# Patient Record
Sex: MALE | Race: WHITE | NOT HISPANIC OR LATINO | URBAN - METROPOLITAN AREA
[De-identification: names, ages, dates, MRNs, and addresses within clinical notes are randomized per-mention and may not be internally consistent; named-entity substitution may affect disease eponyms.]

---

## 2023-02-13 LAB — HCV AB SER-ACNC: NORMAL

## 2023-03-09 ENCOUNTER — OFFICE VISIT (OUTPATIENT)
Dept: GASTROENTEROLOGY | Facility: CLINIC | Age: 48
End: 2023-03-09

## 2023-03-09 VITALS
SYSTOLIC BLOOD PRESSURE: 118 MMHG | BODY MASS INDEX: 24.61 KG/M2 | HEART RATE: 88 BPM | DIASTOLIC BLOOD PRESSURE: 72 MMHG | WEIGHT: 162.4 LBS | HEIGHT: 68 IN

## 2023-03-09 DIAGNOSIS — T45.1X5A ADVERSE EFFECT OF METHOTREXATE, INITIAL ENCOUNTER: ICD-10-CM

## 2023-03-09 DIAGNOSIS — R79.89 ABNORMAL LIVER FUNCTION TESTS: Primary | ICD-10-CM

## 2023-03-09 DIAGNOSIS — K76.0 HEPATIC STEATOSIS: ICD-10-CM

## 2023-03-09 RX ORDER — FOLIC ACID 1 MG/1
1000 TABLET ORAL DAILY
COMMUNITY
Start: 2023-02-20

## 2023-03-09 RX ORDER — ROSUVASTATIN CALCIUM 5 MG/1
5 TABLET, COATED ORAL DAILY
COMMUNITY
Start: 2023-02-17

## 2023-03-09 NOTE — PROGRESS NOTES
[facility] - Outpatient Consultation  Dalia Kent 52 y o  male MRN: [id number]  Encounter: [id number]    PCP:  Shekhar Cazares MD, [phone]  Referring Provider:  No ref  provider found,     Patient: Dalia Kent, 1975  Reason for Referral: hepatic steatosis     ASSESSMENT/PLAN:  52 y o  male with history of seronegative RA and vitamin D deficiency who presents for initial evaluation for hepatic steatosis  He has no acute liver specific complaints or clinical evidence of hepatic decompensation  He was incidentally found to have mildly elevated serum ALT ~60 and was noted to have mild hepatic steatosis on imaging  He has been on MTX for the last 7 years for treatment seronegative RA, although his cumulative dose is unclear at this time given variability in dosing, but likely exceeds 4-5 grams  He also reports daily EtOH use with up to 7 drinks weekly  I suspect that he has fatty liver disease due to combination EtOH and MTX use but would recommend completing a serologic work-up to exclude autoimmune liver disease given his predisposition  Would recommend an US elastography to assess for hepatic fibrosis and would recommend permanent discontinuation of MTX if he has >F2 disease  MTX often causes elevations in serum transaminases but long term therapy has been associated with fatty liver disease, hepatic fibrosis and rare instances, cirrhosis with portal hypertension  Patients who develop fibrosis often have other risk factors for fatty liver disease, such as EtOH use, obesity, DM and concurrent administration of other hepatoxic agents  Cumulative doses between 1-10 grams have been shown to be associated with mild-moderate histologic abnormalities and fibrosis  However, clinically apparent liver disease and fibrosis is rare with weekly use  I advised that he should continue to avoid EtOH, interval weight gain, and hepatotoxic medications   He will return to clinic in 3 months or sooner if needed  Thank you for the opportunity to consult in his care  - US elastography  - CBC, CMP and INR   - CHRIS, ASMA, AMA and quantitative immunoglobulins  - HAV IgG, HBsAg, HBcAb, HBsAb, HCV Ab; vaccinate if non-immune   - A1AT levels and phenotype  - Iron studies    Eliazar Downey MD  Division of Gastroenterology and Hepatology  [address] [facility]    ============================================================================  CC/HPI: 52 y o  male with history of seronegative RA and vitamin D deficiency who presents for initial evaluation for hepatic steatosis  He has been on MTX for at least 7 years for treatment of seronegative RA  He is unsure of his cumulative dose but states that he had been as high as 20 mg weekly for 1-2 years  He has fluctuated in dosing and was previously on 7 5 mg weekly  He was noted to have abnormal liver tests earlier this year with serum ALT ~60, although records of his blood work are not available to review  He had an US which showed mild hepatic steatosis  He reported daily EtOH use with 1 beer daily but denies excessive EtOH consumption  He stopped drinking after knowledge of his acute hepatitis and his rheumatologist is considering switching MTX for sulfasalazine  He reports improvement of his liver tests after making these changes  He has no personal history of liver disease but reports a family history of EtOH cirrhosis  He denies recreational drug use  ROS: Complete review of systems otherwise negative       PAST MEDICAL/SURGICAL HISTORY:  Past Medical History:   Diagnosis Date   • Hyperlipidemia         Past Surgical History:   Procedure Laterality Date   • COLONOSCOPY         FAMILY/SOCIAL HISTORY:  Family History   Problem Relation Age of Onset   • Cancer Mother    • Breast cancer Mother        Social History     Tobacco Use   • Smoking status: Never   • Smokeless tobacco: Never   • Tobacco comments:     Cigars   Vaping Use   • Vaping Use: Never used   Substance Use Topics   • Alcohol use: Not Currently   • Drug use: Not Currently       MEDICATIONS:  Current Outpatient Medications on File Prior to Visit   Medication Sig Dispense Refill   • folic acid (FOLVITE) 1 mg tablet Take 1,000 mcg by mouth daily     • rosuvastatin (CRESTOR) 5 mg tablet Take 5 mg by mouth daily       No current facility-administered medications on file prior to visit  No Known Allergies    PHYSICAL EXAM:  /72 (BP Location: Left arm, Patient Position: Sitting, Cuff Size: Standard)   Pulse 88   Ht 5' 8" (1 727 m)   Wt 73 7 kg (162 lb 6 4 oz)   BMI 24 69 kg/m²   GENERAL: NAD, AAO  HEENT: anicteric, OP clear, MMM  ABDOMEN: S/ND/NT, normoactive BS, no hepatomegaly, spleen not palpable  EXTREMITIES: no edema  SKIN: no rashes, no palmar erythema, no spider angiomata   NEURO: normal gait, no tremor, no asterixis     LABS/RADIOLOGY/ENDOSCOPY:  No results found for: WBC, HGB, HCT, PLT, LABGLUC, GLUF, BUN, CREATININE, NA, K, CL, CO2, PROT, BILIDIR, ALKPHOS, ALT, AST, GLOB, CALCIUM, EGFR, CHOL, TRIG, HDL, LDL, CALC, PT, INR, PTT, GGT    US abdomen 2/2023    Computed MELD-Na score unavailable  Necessary lab results were not found in the last year  Computed MELD score unavailable  Necessary lab results were not found in the last year

## 2023-03-10 ENCOUNTER — TELEPHONE (OUTPATIENT)
Dept: GASTROENTEROLOGY | Facility: CLINIC | Age: 48
End: 2023-03-10

## 2023-03-10 NOTE — TELEPHONE ENCOUNTER
----- Message from Jenifer Carey MD sent at 3/9/2023  4:41 PM EST -----  Hi can you also fax my note to his rheumatology office?

## 2023-03-10 NOTE — TELEPHONE ENCOUNTER
Called Rheumatologist for recent labs and I have faxed over the office notes   Fax # for Rheumatologist - [phone]

## 2023-04-04 ENCOUNTER — TELEPHONE (OUTPATIENT)
Dept: GASTROENTEROLOGY | Facility: CLINIC | Age: 48
End: 2023-04-04

## 2023-04-05 ENCOUNTER — TELEPHONE (OUTPATIENT)
Dept: GASTROENTEROLOGY | Facility: CLINIC | Age: 48
End: 2023-04-05

## 2023-04-05 NOTE — TELEPHONE ENCOUNTER
Discussed elastography results which showed no advanced fibrosis  He will be getting his blood work today

## 2023-06-08 ENCOUNTER — TELEMEDICINE (OUTPATIENT)
Dept: GASTROENTEROLOGY | Facility: CLINIC | Age: 48
End: 2023-06-08
Payer: COMMERCIAL

## 2023-06-08 VITALS — HEIGHT: 68 IN | BODY MASS INDEX: 23.95 KG/M2 | WEIGHT: 158 LBS

## 2023-06-08 DIAGNOSIS — T45.1X5A ADVERSE EFFECT OF METHOTREXATE, INITIAL ENCOUNTER: ICD-10-CM

## 2023-06-08 DIAGNOSIS — R79.89 ABNORMAL LIVER FUNCTION TESTS: Primary | ICD-10-CM

## 2023-06-08 DIAGNOSIS — K70.9 ALCOHOLIC LIVER DISEASE (HCC): ICD-10-CM

## 2023-06-08 PROCEDURE — 99213 OFFICE O/P EST LOW 20 MIN: CPT | Performed by: STUDENT IN AN ORGANIZED HEALTH CARE EDUCATION/TRAINING PROGRAM

## 2023-06-08 NOTE — PROGRESS NOTES
[facility] - Outpatient Consultation  Bambi Henderson 52 y o  male MRN: [id number]  Encounter: [id number]     PCP:  Alin Garcia MD, [phone]  Referring Provider:  No ref  provider found,      Patient: Bambi Henderson, 1975  Reason for Referral: hepatic steatosis      Virtual Regular Visit    Verification of patient location:    Patient is located at Other in the following state in which I hold an active license [place]      Assessment/Plan:   52 y o  male with history of seronegative RA and vitamin D deficiency who presents for follow up for hepatic steatosis  He has no acute liver specific complaints or clinical evidence of hepatic decompensation      He was incidentally found to have mildly elevated serum ALT ~60 and was noted to have mild hepatic steatosis on imaging  He has been on MTX for the last 7 years for treatment seronegative RA, although his cumulative dose is unclear at this time given variability in dosing, but likely exceeds 4-5 grams  He also reports daily EtOH use with up to 7 drinks weekly but cut down on his intake significantly after knowledge of his liver disease  He had a serologic work-up which was negative for autoimmune disease  Liver chemistries have since normalized with EtOH abstinence and his elastography showed normal LSM        MTX often causes elevations in serum transaminases but long term therapy has been associated with fatty liver disease, hepatic fibrosis and rare instances, cirrhosis with portal hypertension  Patients who develop fibrosis often have other risk factors for fatty liver disease, such as EtOH use, obesity, DM and concurrent administration of other hepatoxic agents  Cumulative doses between 1-10 grams have been shown to be associated with mild-moderate histologic abnormalities and fibrosis  However, clinically apparent liver disease and fibrosis is rare with weekly use       We discussed that he may resume MTX but would recommend continued monitoring of his liver chemistries every 3 months as well as annual elastography to evaluate for hepatic fibrosis  I advised that he should continued to avoid ETOH and maintain a lean body weight to minimize his risk for disease progression  He has an appointment with his rheumatologist today to discuss whether he should be restarted on MTX or consider alternative agents  Timing of his follow up will depend on whether he remains on MTX  If he is to resume MTX, he should to follow up in 6 months or sooner if needed  Thank you for the opportunity to consult in his care  - Needs HAV and HBV vaccination with PCP or local pharmacy     Problem List Items Addressed This Visit    None           Reason for visit is   Chief Complaint   Patient presents with   • Follow-up   • Virtual Regular Visit        Encounter provider Constantin Castillo MD    Provider located at [address]  [phone]      Recent Visits  No visits were found meeting these conditions  Showing recent visits within past 7 days and meeting all other requirements  Today's Visits  Date Type Provider Dept   06/08/23 Telemedicine Constantin Castillo MD [facility] today's visits and meeting all other requirements  Future Appointments  No visits were found meeting these conditions  Showing future appointments within next 150 days and meeting all other requirements       The patient was identified by name and date of birth  Lennox Ferretti was informed that this is a telemedicine visit and that the visit is being conducted through the [address] [employer] platform  He agrees to proceed     My office door was closed  No one else was in the room  He acknowledged consent and understanding of privacy and security of the video platform   The patient has agreed to participate and understands they can discontinue the "visit at any time  Patient is aware this is a billable service  Kushal Hodges is a 52 y o  male with history of seronegative RA and vitamin D deficiency who presents for follow up evaluation for hepatic steatosis  Interval events  - Has stopped EtOH and MTX with improvement of ALT to 20-30   - Serologic work-up negative and US elastography showed normal LSM     Extended liver history   He has been on MTX for at least 7 years for treatment of seronegative RA  He is unsure of his cumulative dose but states that he had been as high as 20 mg weekly for 1-2 years  He has fluctuated in dosing and was previously on 7 5 mg weekly      He was noted to have abnormal liver tests earlier this year with serum ALT ~60, although records of his blood work are not available to review  He had an US which showed mild hepatic steatosis       He reported daily EtOH use with 1 beer daily but denies excessive EtOH consumption  He stopped drinking after knowledge of his acute hepatitis and his rheumatologist is considering switching MTX for sulfasalazine  He reports improvement of his liver tests after making these changes       He has no personal history of liver disease but reports a family history of EtOH cirrhosis  He denies recreational drug use  Past Medical History:   Diagnosis Date   • Hyperlipidemia        Past Surgical History:   Procedure Laterality Date   • COLONOSCOPY         Current Outpatient Medications   Medication Sig Dispense Refill   • folic acid (FOLVITE) 1 mg tablet Take 1,000 mcg by mouth daily     • rosuvastatin (CRESTOR) 5 mg tablet Take 5 mg by mouth daily       No current facility-administered medications for this visit  No Known Allergies    ROS: Complete review of systems otherwise negative       Video Exam    Vitals:    06/08/23 0823   Weight: 71 7 kg (158 lb)   Height: 5' 8\" (1 727 m)     GENERAL: NAD, AAO  HEENT: anicteric, OP clear, MMM  SKIN: no rashes, no palmar " erythema, no spider angiomata   NEURO: normal gait, no tremor, no asterixis     CHRIS-  ASMA-  AMA-  A1AT wnl  A1AT MM  Viral hepatitis panel negative     US elastography: normal LSM    Visit Time  Total Visit Duration: 9 min

## 2023-10-10 ENCOUNTER — TELEPHONE (OUTPATIENT)
Age: 48
End: 2023-10-10

## 2023-10-10 NOTE — TELEPHONE ENCOUNTER
As requested, faxed most recent office notes to [place] at [facility]. Fax confirmation scanned into pt's chart.

## 2023-10-10 NOTE — TELEPHONE ENCOUNTER
Patients GI provider:  Anabelle Oropeza    Number to return call: [phone]    Reason for call: [place] from [facility] calling to obtain the most recent office visit notes for the patient. Requesting these be faxed to [phone].      Scheduled procedure/appointment date if applicable: N/A

## 2024-10-28 ENCOUNTER — TELEPHONE (OUTPATIENT)
Age: 49
End: 2024-10-28

## 2024-10-28 NOTE — TELEPHONE ENCOUNTER
"Left voicemail message today @ [phone] informing Pt that Pt has been scheduled for a \"virtual visit\"  at 9:00 am on 2/20/25 with Dr. Tammy Juarez.  Pt further informed, via vm message, that Pt will receive phone call from office staff approximately 15 minutes before visit is scheduled to start in order to prepare for \"virtual\" visit.  Pt given office phone number, via vm message, should Pt have any questions/concerns and/or need to reschedule appointment.   "

## 2025-06-25 ENCOUNTER — PREP FOR PROCEDURE (OUTPATIENT)
Age: 50
End: 2025-06-25

## 2025-06-25 ENCOUNTER — TELEPHONE (OUTPATIENT)
Age: 50
End: 2025-06-25

## 2025-06-25 DIAGNOSIS — Z12.11 SCREENING FOR COLON CANCER: Primary | ICD-10-CM

## 2025-06-25 NOTE — TELEPHONE ENCOUNTER
06/25/25  Screened by: Radha Dos Santos MA    Referring Provider PCP    Pre- Screening: BMI: 24.3      Has patient been referred for a routine screening Colonoscopy? yes  Is the patient between 45-75 years old? yes      Previous Colonoscopy no   If yes:    Date:  Facility:     Reason:       Does the patient want to see a Gastroenterologist prior to their procedure OR are they having any GI symptoms? no    Has the patient been hospitalized or had abdominal surgery in the past 6 months? no    Does the patient use supplemental oxygen? no    Does the patient take Coumadin, Lovenox, Plavix, Elliquis, Xarelto, or other blood thinning medication? no    Has the patient had a stroke, cardiac event, or stent placed in the past year? no

## 2025-06-25 NOTE — TELEPHONE ENCOUNTER
Scheduled date of colonoscopy (as of today): 7/25/25    Physician performing colonoscopy: KAM  Location of colonoscopy: [facility]  Bowel prep reviewed with patient: LEONOR/BI  Instructions reviewed with patient by:JESSIKA  Clearances: KADI    Pt has RA and takes Methotrexate    EMAIL

## 2025-07-11 ENCOUNTER — ANESTHESIA EVENT (OUTPATIENT)
Dept: ANESTHESIOLOGY | Facility: AMBULATORY SURGERY CENTER | Age: 50
End: 2025-07-11

## 2025-07-11 ENCOUNTER — ANESTHESIA (OUTPATIENT)
Dept: ANESTHESIOLOGY | Facility: AMBULATORY SURGERY CENTER | Age: 50
End: 2025-07-11

## 2025-07-14 ENCOUNTER — TELEPHONE (OUTPATIENT)
Dept: GASTROENTEROLOGY | Facility: CLINIC | Age: 50
End: 2025-07-14

## 2025-07-25 ENCOUNTER — ANESTHESIA EVENT (OUTPATIENT)
Dept: GASTROENTEROLOGY | Facility: AMBULATORY SURGERY CENTER | Age: 50
End: 2025-07-25

## 2025-07-25 ENCOUNTER — HOSPITAL ENCOUNTER (OUTPATIENT)
Dept: GASTROENTEROLOGY | Facility: AMBULATORY SURGERY CENTER | Age: 50
Discharge: HOME | End: 2025-07-25
Attending: STUDENT IN AN ORGANIZED HEALTH CARE EDUCATION/TRAINING PROGRAM
Payer: COMMERCIAL

## 2025-07-25 ENCOUNTER — ANESTHESIA (OUTPATIENT)
Dept: GASTROENTEROLOGY | Facility: AMBULATORY SURGERY CENTER | Age: 50
End: 2025-07-25

## 2025-07-25 VITALS
DIASTOLIC BLOOD PRESSURE: 53 MMHG | SYSTOLIC BLOOD PRESSURE: 94 MMHG | HEART RATE: 74 BPM | RESPIRATION RATE: 22 BRPM | BODY MASS INDEX: 24.1 KG/M2 | WEIGHT: 159 LBS | HEIGHT: 68 IN | OXYGEN SATURATION: 97 % | TEMPERATURE: 98.1 F

## 2025-07-25 DIAGNOSIS — Z12.11 SCREENING FOR COLON CANCER: ICD-10-CM

## 2025-07-25 PROCEDURE — 45385 COLONOSCOPY W/LESION REMOVAL: CPT | Performed by: INTERNAL MEDICINE

## 2025-07-25 PROCEDURE — 88305 TISSUE EXAM BY PATHOLOGIST: CPT | Performed by: PATHOLOGY

## 2025-07-25 RX ORDER — LIDOCAINE HYDROCHLORIDE 10 MG/ML
INJECTION, SOLUTION EPIDURAL; INFILTRATION; INTRACAUDAL; PERINEURAL AS NEEDED
Status: DISCONTINUED | OUTPATIENT
Start: 2025-07-25 | End: 2025-07-25

## 2025-07-25 RX ORDER — SODIUM CHLORIDE, SODIUM LACTATE, POTASSIUM CHLORIDE, CALCIUM CHLORIDE 600; 310; 30; 20 MG/100ML; MG/100ML; MG/100ML; MG/100ML
INJECTION, SOLUTION INTRAVENOUS CONTINUOUS PRN
Status: DISCONTINUED | OUTPATIENT
Start: 2025-07-25 | End: 2025-07-25

## 2025-07-25 RX ORDER — PROPOFOL 10 MG/ML
INJECTION, EMULSION INTRAVENOUS AS NEEDED
Status: DISCONTINUED | OUTPATIENT
Start: 2025-07-25 | End: 2025-07-25

## 2025-07-25 RX ORDER — SODIUM CHLORIDE, SODIUM LACTATE, POTASSIUM CHLORIDE, CALCIUM CHLORIDE 600; 310; 30; 20 MG/100ML; MG/100ML; MG/100ML; MG/100ML
50 INJECTION, SOLUTION INTRAVENOUS CONTINUOUS
Status: DISCONTINUED | OUTPATIENT
Start: 2025-07-25 | End: 2025-07-29 | Stop reason: HOSPADM

## 2025-07-25 RX ADMIN — PROPOFOL 20 MG: 10 INJECTION, EMULSION INTRAVENOUS at 10:31

## 2025-07-25 RX ADMIN — PROPOFOL 100 MG: 10 INJECTION, EMULSION INTRAVENOUS at 10:21

## 2025-07-25 RX ADMIN — PROPOFOL 70 MG: 10 INJECTION, EMULSION INTRAVENOUS at 10:29

## 2025-07-25 RX ADMIN — PROPOFOL 20 MG: 10 INJECTION, EMULSION INTRAVENOUS at 10:26

## 2025-07-25 RX ADMIN — SODIUM CHLORIDE, SODIUM LACTATE, POTASSIUM CHLORIDE, CALCIUM CHLORIDE 50 ML/HR: 600; 310; 30; 20 INJECTION, SOLUTION INTRAVENOUS at 10:01

## 2025-07-25 RX ADMIN — SODIUM CHLORIDE, SODIUM LACTATE, POTASSIUM CHLORIDE, CALCIUM CHLORIDE: 600; 310; 30; 20 INJECTION, SOLUTION INTRAVENOUS at 10:21

## 2025-07-25 RX ADMIN — PROPOFOL 20 MG: 10 INJECTION, EMULSION INTRAVENOUS at 10:37

## 2025-07-25 RX ADMIN — PROPOFOL 50 MG: 10 INJECTION, EMULSION INTRAVENOUS at 10:24

## 2025-07-25 RX ADMIN — LIDOCAINE HYDROCHLORIDE 5 ML: 10 INJECTION, SOLUTION EPIDURAL; INFILTRATION; INTRACAUDAL; PERINEURAL at 10:21

## 2025-07-25 RX ADMIN — PROPOFOL 40 MG: 10 INJECTION, EMULSION INTRAVENOUS at 10:25

## 2025-07-25 NOTE — ANESTHESIA POSTPROCEDURE EVALUATION
Post-Op Assessment Note    CV Status:  Stable  Pain Score: 0    Pain management: adequate       Mental Status:  Alert and awake   Hydration Status:  Euvolemic   PONV Controlled:  Controlled   Airway Patency:  Patent     Post Op Vitals Reviewed: Yes    No anethesia notable event occurred.            Last Filed PACU Vitals:  Vitals Value Taken Time   Temp     Pulse 81 07/25/25 10:43   BP 98/56 07/25/25 10:43   Resp 16 07/25/25 10:43   SpO2 96 % 07/25/25 10:43       Modified Kelly:     Vitals Value Taken Time   Activity 2 07/25/25 10:59   Respiration 2 07/25/25 10:59   Circulation 2 07/25/25 10:59   Consciousness 2 07/25/25 10:59   Oxygen Saturation 2 07/25/25 10:59     Modified Kelly Score: 10

## 2025-07-25 NOTE — ANESTHESIA PREPROCEDURE EVALUATION
Procedure:  COLONOSCOPY    Relevant Problems   No relevant active problems        Physical Exam    Airway     Mallampati score: II  TM Distance: >3 FB  Neck ROM: full      Cardiovascular  Rhythm: regular, Rate: normal    Dental   No notable dental hx     Pulmonary   Breath sounds clear to auscultation    Neurological  - normal exam    Other Findings        Anesthesia Plan  ASA Score- 2     Anesthesia Type- IV sedation with anesthesia with ASA Monitors.         Additional Monitors:     Airway Plan: natural airway.           Plan Factors-Exercise tolerance (METS): >4 METS.    Chart reviewed.   Existing labs reviewed. Patient summary reviewed.    Patient is not a current smoker.              Induction- intravenous.    Postoperative Plan- .   Monitoring Plan - Monitoring plan - standard ASA monitoring          Informed Consent- Anesthetic plan and risks discussed with patient.  I personally reviewed this patient with the CRNA. Discussed and agreed on the Anesthesia Plan with the CRNA..      NPO Status:  Vitals Value Taken Time   Date of last liquid 07/25/25 07/25/25 09:42   Time of last liquid 0520 07/25/25 09:42   Date of last solid 07/23/25 07/25/25 09:42   Time of last solid 1800 07/25/25 09:42

## 2025-07-31 PROCEDURE — 88305 TISSUE EXAM BY PATHOLOGIST: CPT | Performed by: PATHOLOGY
